# Patient Record
(demographics unavailable — no encounter records)

---

## 2017-12-28 NOTE — EMERGENCY ROOM REPORT
History of Present Illness


General


Chief Complaint:  Abdominal Pain


Source:  Patient


 (PEGGY CISNEROS M.D.)





Present Illness


HPI


52-year-old female presents ED complaining of abdominal pain and back pain 

starting 7 hours ago.  Notes pain to right upper quadrant radiating to the 

back.  10/10, sharp.  Patient also complaining of shortness of breath.  Patient 

has history of lupus and history of PE.  Denies any chest pain.  Denies any 

fevers chills.  Denies nausea or vomiting.  No other aggravating or leading 

factors.  Denies any other associated symptoms


 (PEGGY CISNEROS M.D.)


Allergies:  


Coded Allergies:  


     PENICILLINS (Verified  Allergy, Severe, Shortness of Breath, 12/28/17)





Patient History


Past Medical History:  asthma, other - lupus


Pertinent Family History:  none


Social History:  Denies: smoking, alcohol use, drug use


Last Menstrual Period:  now


Pregnant Now:  No


Immunizations:  UTD


Reviewed Nursing Documentation:  PMH: Agreed, PSxH: Agreed (PEGGY CISNEROS M.D.)





Nursing Documentation-PMH


Past Medical History:  No History, Except For


Hx Cardiac Problems:  No


Hx Asthma:  Yes


Hx Neurological Problems:  Yes - SLE


 (PEGGY CISNEROS M.D.)





Review of Systems


All Other Systems:  negative except mentioned in HPI


 (PEGGY CISNEROS M.D.)





Physical Exam





Vital Signs








  Date Time  Temp Pulse Resp B/P (MAP) Pulse Ox O2 Delivery O2 Flow Rate FiO2


 


12/28/17 19:48 97.9 77 16 134/72 100   








Sp02 EP Interpretation:  reviewed, normal


General Appearance:  alert, GCS 15, non-toxic, moderate distress, obese


Head:  normocephalic, atraumatic


Eyes:  bilateral eye normal inspection, bilateral eye PERRL


ENT:  hearing grossly normal, normal pharynx, no angioedema, normal voice


Neck:  full range of motion, supple/symm/no masses


Respiratory:  chest non-tender, lungs clear, normal breath sounds, speaking 

full sentences


Cardiovascular #1:  regular rate, rhythm, no edema


Cardiovascular #2:  2+ carotid (R), 2+ carotid (L), 2+ radial (R), 2+ radial (L)

, 2+ dorsalis pedis (R), 2+ dorsalis pedis (L)


Gastrointestinal:  normal bowel sounds, soft, non-distended, no guarding, no 

rebound, tenderness - RUQ


Rectal:  deferred


Genitourinary:  normal inspection, no CVA tenderness


Musculoskeletal:  back normal, gait/station normal, normal range of motion, non-

tender


Neurologic:  alert, oriented x3, responsive, motor strength/tone normal, 

sensory intact, speech normal


Psychiatric:  judgement/insight normal, memory normal, mood/affect normal, no 

suicidal/homicidal ideation


Reflexes:  3+ bicep (R), 3+ bicep (L), 3+ tricep (R), 3+ tricep (L), 3+ knee (R)

, 3+ knee (L)


Skin:  normal color, no rash, warm/dry, well hydrated


Lymphatic:  no adenopathy


 (PEGGY CISNEROS M.D.)





Medical Decision Making





Labs








Test


  12/28/17


20:54


 


White Blood Count


  7.7 K/UL


(4.8-10.8)


 


Red Blood Count


  4.58 M/UL


(4.20-5.40)


 


Hemoglobin


  10.6 G/DL


(12.0-16.0)


 


Hematocrit


  38.8 %


(37.0-47.0)


 


Mean Corpuscular Volume 85 FL (80-99) 


 


Mean Corpuscular Hemoglobin


  23.2 PG


(27.0-31.0)


 


Mean Corpuscular Hemoglobin


Concent 27.4 G/DL


(32.0-36.0)


 


Red Cell Distribution Width


  17.0 %


(11.6-14.8)


 


Platelet Count


  341 K/UL


(150-450)


 


Mean Platelet Volume


  7.0 FL


(6.5-10.1)


 


Neutrophils (%) (Auto)


  70.1 %


(45.0-75.0)


 


Lymphocytes (%) (Auto)


  21.1 %


(20.0-45.0)


 


Monocytes (%) (Auto)


  5.5 %


(1.0-10.0)


 


Eosinophils (%) (Auto)


  1.8 %


(0.0-3.0)


 


Basophils (%) (Auto)


  1.5 %


(0.0-2.0)


 


Prothrombin Time


  9.5 SEC


(9.30-11.50)


 


Prothromb Time International


Ratio 0.9 (0.9-1.1) 


 


 


Activated Partial


Thromboplast Time 25 SEC (23-33) 


 


 


Sodium Level


  133 MMOL/L


(136-145)


 


Potassium Level


  3.6 MMOL/L


(3.5-5.1)


 


Chloride Level


  100 MMOL/L


()


 


Carbon Dioxide Level


  24 MMOL/L


(21-32)


 


Anion Gap


  9 mmol/L


(5-15)


 


Blood Urea Nitrogen


  17 mg/dL


(7-18)


 


Creatinine


  0.9 MG/DL


(0.55-1.30)


 


Estimat Glomerular Filtration


Rate > 60 mL/min


(>60)


 


Glucose Level


  104 MG/DL


()


 


Calcium Level


  9.9 MG/DL


(8.5-10.1)


 


Total Bilirubin


  0.3 MG/DL


(0.2-1.0)


 


Aspartate Amino Transf


(AST/SGOT) 13 U/L (15-37) 


 


 


Alanine Aminotransferase


(ALT/SGPT) 17 U/L (12-78) 


 


 


Alkaline Phosphatase


  84 U/L


()


 


Total Creatine Kinase


  46 U/L


()


 


Creatine Kinase MB


  0.5 NG/ML


(0.0-3.6)


 


Creatine Kinase MB Relative


Index 1.0 


 


 


Troponin I


  0.000 ng/mL


(0.000-0.056)


 


Pro-B-Type Natriuretic Peptide


  17 pg/mL


(0-125)


 


Total Protein


  9.1 G/DL


(6.4-8.2)


 


Albumin


  3.8 G/DL


(3.4-5.0)


 


Globulin 5.3 g/dL 


 


Albumin/Globulin Ratio 0.7 (1.0-2.7) 


 


Lipase


  190 U/L


()








 (PEGGY CISNEROS M.D.)


ER Course


CTA CHEST:


CTA ABDOMEN & PELVIS With Contrast:





Comparison CTA chest 2/27/14.


Normal caliber aorta without evidence for dissection.


Cholelithiasis within distended gallbladder. Consider correlation with 

ultrasound, as indicated.


Limited for small peripheral PE. No central PE. Mild atelectasis. No infiltrate 

seen.





Incidental findings include enlarged fibroid uterus.








Patient was tx with morphine, has been stable during ED stay


labs unremarkable





Will dc home with motrin and OBGYN follow up for large fibroid uterus





Laboratory Tests








Test


  12/28/17


20:54


 


White Blood Count


  7.7 K/UL


(4.8-10.8)


 


Red Blood Count


  4.58 M/UL


(4.20-5.40)


 


Hemoglobin


  10.6 G/DL


(12.0-16.0)  L


 


Hematocrit


  38.8 %


(37.0-47.0)


 


Mean Corpuscular Volume 85 FL (80-99)  


 


Mean Corpuscular Hemoglobin


  23.2 PG


(27.0-31.0)  L


 


Mean Corpuscular Hemoglobin


Concent 27.4 G/DL


(32.0-36.0)  L


 


Red Cell Distribution Width


  17.0 %


(11.6-14.8)  H


 


Platelet Count


  341 K/UL


(150-450)


 


Mean Platelet Volume


  7.0 FL


(6.5-10.1)


 


Neutrophils (%) (Auto)


  70.1 %


(45.0-75.0)


 


Lymphocytes (%) (Auto)


  21.1 %


(20.0-45.0)


 


Monocytes (%) (Auto)


  5.5 %


(1.0-10.0)


 


Eosinophils (%) (Auto)


  1.8 %


(0.0-3.0)


 


Basophils (%) (Auto)


  1.5 %


(0.0-2.0)


 


Prothrombin Time


  9.5 SEC


(9.30-11.50)


 


Prothrombin Time INR 0.9 (0.9-1.1)  


 


PTT


  25 SEC (23-33)


 


 


Sodium Level


  133 MMOL/L


(136-145)  L


 


Potassium Level


  3.6 MMOL/L


(3.5-5.1)


 


Chloride Level


  100 MMOL/L


()


 


Carbon Dioxide Level


  24 MMOL/L


(21-32)


 


Anion Gap


  9 mmol/L


(5-15)


 


Blood Urea Nitrogen


  17 mg/dL


(7-18)


 


Creatinine


  0.9 MG/DL


(0.55-1.30)


 


Estimate Glomerular


Filtration Rate > 60 mL/min


(>60)


 


Glucose Level


  104 MG/DL


()


 


Calcium Level


  9.9 MG/DL


(8.5-10.1)


 


Total Bilirubin


  0.3 MG/DL


(0.2-1.0)


 


Aspartate Amino Transferase


(AST) 13 U/L (15-37)


L


 


Alanine Aminotransferase (ALT)


  17 U/L (12-78)


 


 


Alkaline Phosphatase


  84 U/L


()


 


Total Creatine Kinase


  46 U/L


()


 


Creatine Kinase MB


  0.5 NG/ML


(0.0-3.6)


 


Creatine Kinase MB Relative


Index 1.0  


 


 


Troponin I


  0.000 ng/mL


(0.000-0.056)


 


Pro-B-Type Natriuretic Peptide


  17 pg/mL


(0-125)


 


Total Protein


  9.1 G/DL


(6.4-8.2)  H


 


Albumin


  3.8 G/DL


(3.4-5.0)


 


Globulin 5.3 g/dL  


 


Albumin/Globulin Ratio


  0.7 (1.0-2.7)


L


 


Lipase


  190 U/L


()








 (Geoffrey Glass M.D.)


EKG Diagnostic Results


Rate:  normal


Rhythm:  NSR


ST Segments:  no acute changes


ASA given to the pt in ED:  No


 (PEGGY CISNEROS M.D.)





Rhythm Strip Diag. Results


EP Interpretation:  yes


Rhythm:  NSR, no PVC's, no ectopy


 (PEGGY CISNEROS M.D.)





Last Vital Signs








  Date Time  Temp Pulse Resp B/P (MAP) Pulse Ox O2 Delivery O2 Flow Rate FiO2


 


12/28/17 20:56 97.8       


 


12/28/17 19:48  77 16 134/72 100   








Status:  improved


 (PEGGY CISNEROS M.D.)











PEGGY CISNEROS M.D. Dec 28, 2017 22:32


Geoffrey Glass M.D. Dec 29, 2017 02:39

## 2017-12-29 NOTE — DIAGNOSTIC IMAGING REPORT
Indication: Chest and abdominal pain

 

Technique: Continuous helical transaxial imaging of the chest, abdomen and pelvis was

obtained from the thoracic inlet to the pubic symphysis during rapid intravenous

contrast administration. Arterial phase of enhancement obtained. Coronal 2-D

reformats were also obtained and maximum intensity projection images in multiple

planes. Study obtained in a Siemens sensation 64 slice CT. 

 

Total Dose length Product (DLP):  1172 mGycm

 

CT Dose Index Volume (CTDIvol):   0.15, 8.11, 16.22, 17.67 mGy

 

Comparison: CTA chest 2/20/1714

 

Findings:   Thoracic and abdominal aorta appear normal. There is no dissection or

aneurysm. Common origin brachiocephalic artery and left common carotid artery noted.

Left subclavian artery noted. These vessels are widely patent. Celiac artery, SMA,

single bilateral renal arteries and inferior mesenteric artery appear widely patent.

Iliac and common femoral artery appear normal.

 

The lungs are clear. The heart is unremarkable in appearance. Pulmonary artery is

unremarkable. No pleural or pericardial effusion identified. Gallstone noted. No free

fluid or free air identified. There is no evidence of bowel obstruction. Uterus is

enlarged and heterogeneous likely on the basis of underlying fibroids.

 

IMPRESSION:

 

Negative evaluation of the thoracic and abdominal aorta and major branches.

 

Incidental findings including gallstones, fibroid uterus demonstrated.

 

[facility] has communicated the preliminary results to the Emergency

Department.  Their findings are largely concordant with this report.

 

The CT scanner at [facility] is accredited by the [employer] College [employer]

Radiology and the scans are performed using dose optimization techniques as

appropriate to a performed exam including Automatic Exposure control.

## 2018-01-09 NOTE — CARDIOLOGY REPORT
--------------- APPROVED REPORT --------------





EKG Measurement

Heart [id number]

AL [id number]

[id number]

[id number]

[id number]





Normal sinus rhythm with sinus arrhythmia

Normal ECG